# Patient Record
Sex: FEMALE | Race: WHITE | NOT HISPANIC OR LATINO | Employment: FULL TIME | URBAN - NONMETROPOLITAN AREA
[De-identification: names, ages, dates, MRNs, and addresses within clinical notes are randomized per-mention and may not be internally consistent; named-entity substitution may affect disease eponyms.]

---

## 2023-07-10 LAB
HEPATITIS B VIRUS SURFACE AB (MIU/ML) IN SERUM: 23.5 MIU/ML
MUMPS IGG ANTIBODY: POSITIVE
RUBELLA VIRUS IGG AB: POSITIVE
RUBEOLA IGG ANTIBODY: POSITIVE
VARICELLA ZOSTER IGG: POSITIVE

## 2023-07-12 LAB — HEPATITIS B VIRUS SURFACE AG PRESENCE IN SERUM: NONREACTIVE

## 2023-07-13 LAB
NIL(NEG) CONTROL SPOT COUNT: NORMAL
PANEL A SPOT COUNT: 0
PANEL B SPOT COUNT: 0
POS CONTROL SPOT COUNT: NORMAL
T-SPOT. TB INTERPRETATION: NEGATIVE

## 2023-09-25 ENCOUNTER — OFFICE VISIT (OUTPATIENT)
Dept: PRIMARY CARE | Facility: CLINIC | Age: 33
End: 2023-09-25
Payer: COMMERCIAL

## 2023-09-25 VITALS
HEIGHT: 66 IN | HEART RATE: 91 BPM | DIASTOLIC BLOOD PRESSURE: 72 MMHG | OXYGEN SATURATION: 98 % | SYSTOLIC BLOOD PRESSURE: 120 MMHG | WEIGHT: 253 LBS | TEMPERATURE: 96.8 F | BODY MASS INDEX: 40.66 KG/M2

## 2023-09-25 DIAGNOSIS — K80.20 CALCULUS OF GALLBLADDER WITHOUT CHOLECYSTITIS WITHOUT OBSTRUCTION: Primary | ICD-10-CM

## 2023-09-25 PROCEDURE — 99214 OFFICE O/P EST MOD 30 MIN: CPT | Performed by: FAMILY MEDICINE

## 2023-09-25 PROCEDURE — 1036F TOBACCO NON-USER: CPT | Performed by: FAMILY MEDICINE

## 2023-09-25 RX ORDER — HYDROXYZINE HYDROCHLORIDE 25 MG/1
TABLET, FILM COATED ORAL
COMMUNITY
Start: 2022-12-06

## 2023-09-25 ASSESSMENT — ENCOUNTER SYMPTOMS
SLEEP DISTURBANCE: 0
UNEXPECTED WEIGHT CHANGE: 0
WEAKNESS: 0
HEMATURIA: 0
NAUSEA: 0
ACTIVITY CHANGE: 0
VOMITING: 0
RHINORRHEA: 0
JOINT SWELLING: 0
BLOOD IN STOOL: 0
DIARRHEA: 0
FLANK PAIN: 0
DYSURIA: 0
ARTHRALGIAS: 0
LIGHT-HEADEDNESS: 0
HEADACHES: 0
EYE ITCHING: 0
COUGH: 0
SORE THROAT: 0
DIZZINESS: 0
ABDOMINAL DISTENTION: 1
DYSPHORIC MOOD: 0
EYE DISCHARGE: 0
NERVOUS/ANXIOUS: 0
ABDOMINAL PAIN: 1
NUMBNESS: 0
SINUS PRESSURE: 0
APPETITE CHANGE: 0
WHEEZING: 0
SHORTNESS OF BREATH: 0
CONSTIPATION: 0
MYALGIAS: 0
PALPITATIONS: 0
FEVER: 0

## 2023-09-26 PROBLEM — F41.9 ANXIETY: Status: ACTIVE | Noted: 2023-09-26

## 2023-09-26 PROBLEM — I47.10 SVT (SUPRAVENTRICULAR TACHYCARDIA) (CMS-HCC): Status: ACTIVE | Noted: 2023-09-26

## 2023-10-02 ENCOUNTER — PREP FOR PROCEDURE (OUTPATIENT)
Dept: SURGERY | Facility: CLINIC | Age: 33
End: 2023-10-02

## 2023-10-02 ENCOUNTER — OFFICE VISIT (OUTPATIENT)
Dept: SURGERY | Facility: CLINIC | Age: 33
End: 2023-10-02
Payer: COMMERCIAL

## 2023-10-02 VITALS
BODY MASS INDEX: 39.87 KG/M2 | HEART RATE: 93 BPM | HEIGHT: 67 IN | TEMPERATURE: 97.6 F | DIASTOLIC BLOOD PRESSURE: 73 MMHG | SYSTOLIC BLOOD PRESSURE: 125 MMHG | WEIGHT: 254 LBS

## 2023-10-02 DIAGNOSIS — K80.00 CHOLELITHIASIS AND ACUTE CHOLECYSTITIS WITHOUT OBSTRUCTION: Primary | ICD-10-CM

## 2023-10-02 DIAGNOSIS — K80.20 CALCULUS OF GALLBLADDER WITHOUT CHOLECYSTITIS WITHOUT OBSTRUCTION: Primary | ICD-10-CM

## 2023-10-02 PROCEDURE — 1036F TOBACCO NON-USER: CPT | Performed by: SURGERY

## 2023-10-02 PROCEDURE — 99244 OFF/OP CNSLTJ NEW/EST MOD 40: CPT | Performed by: SURGERY

## 2023-10-02 RX ORDER — ACETAMINOPHEN 325 MG/1
975 TABLET ORAL ONCE
Status: CANCELLED | OUTPATIENT
Start: 2023-10-02 | End: 2023-10-02

## 2023-10-02 RX ORDER — CELECOXIB 50 MG/1
200 CAPSULE ORAL ONCE
Status: CANCELLED | OUTPATIENT
Start: 2023-10-02 | End: 2023-10-02

## 2023-10-02 RX ORDER — SODIUM CHLORIDE, SODIUM LACTATE, POTASSIUM CHLORIDE, CALCIUM CHLORIDE 600; 310; 30; 20 MG/100ML; MG/100ML; MG/100ML; MG/100ML
100 INJECTION, SOLUTION INTRAVENOUS CONTINUOUS
Status: CANCELLED | OUTPATIENT
Start: 2023-10-02

## 2023-10-02 ASSESSMENT — ENCOUNTER SYMPTOMS
ABDOMINAL PAIN: 1
NAUSEA: 1

## 2023-10-02 NOTE — H&P (VIEW-ONLY)
Subjective   Patient ID: Jasmina Draper is a 33 y.o. female who presents for Cholelithiasis and Abdominal Pain.  Abdominal Pain  Associated symptoms include nausea.    patient was well until 3 weeks ago when she developed severe back pain that radiated to the front.  She has some associated nausea vomiting.  The pain brought her to the emergency room.  In the emergency room she was noted to have gallstones on her CT scan.  She also had focal colitis on the CAT scan.  She admits to bloating and belching.  She has still had symptoms while on a low-fat diet.    Past Medical History:   Diagnosis Date    Anxiety         Current Outpatient Medications on File Prior to Visit   Medication Sig Dispense Refill    hydrOXYzine HCL (Atarax) 25 mg tablet TAKE 1 TABLET BY MOUTH EVERY 8 HOURS AS NEEDED FOR ITCHING/RASH.       No current facility-administered medications on file prior to visit.        Review of Systems   Gastrointestinal:  Positive for abdominal pain and nausea.   All other systems reviewed and are negative.      Vitals:    10/02/23 1416   BP: 125/73   Pulse: 93   Temp: 36.4 °C (97.6 °F)        Objective     Physical Exam  Vitals reviewed. Exam conducted with a chaperone present.   HENT:      Head: Normocephalic.      Nose: Nose normal.      Mouth/Throat:      Pharynx: Oropharynx is clear.   Cardiovascular:      Rate and Rhythm: Normal rate and regular rhythm.      Heart sounds: Normal heart sounds.   Pulmonary:      Effort: Pulmonary effort is normal.      Breath sounds: Normal breath sounds.   Abdominal:      General: Abdomen is flat.      Palpations: Abdomen is soft. There is no mass.      Tenderness: There is abdominal tenderness in the right upper quadrant. There is no guarding.      Hernia: No hernia is present.   Genitourinary:     General: Normal vulva.   Musculoskeletal:         General: Normal range of motion.      Cervical back: Normal range of motion.   Skin:     General: Skin is warm.   Neurological:       General: No focal deficit present.   Psychiatric:         Mood and Affect: Mood normal.         Problem List Items Addressed This Visit       Calculus of gallbladder without cholecystitis without obstruction - Primary        Assessment/Plan   Impression-symptomatic cholelithiasis.  Plan-laparoscopic cholecystectomy 10.12.23.  Risks per consent.    Des Mcclendon MD

## 2023-10-04 ENCOUNTER — PRE-ADMISSION TESTING (OUTPATIENT)
Dept: PREADMISSION TESTING | Facility: HOSPITAL | Age: 33
End: 2023-10-04
Payer: COMMERCIAL

## 2023-10-04 ASSESSMENT — DUKE ACTIVITY SCORE INDEX (DASI)
CAN YOU DO LIGHT WORK AROUND THE HOUSE LIKE DUSTING OR WASHING DISHES: YES
CAN YOU CLIMB A FLIGHT OF STAIRS OR WALK UP A HILL: YES
CAN YOU DO HEAVY WORK AROUND THE HOUSE LIKE SCRUBBING FLOORS OR LIFTING AND MOVING HEAVY FURNITURE: YES
CAN YOU HAVE SEXUAL RELATIONS: YES
TOTAL_SCORE: 58.2
DASI METS SCORE: 9.9
CAN YOU WALK INDOORS, SUCH AS AROUND YOUR HOUSE: YES
CAN YOU RUN A SHORT DISTANCE: YES
CAN YOU PARTICIPATE IN STRENOUS SPORTS LIKE SWIMMING, SINGLES TENNIS, FOOTBALL, BASKETBALL, OR SKIING: YES
CAN YOU PARTICIPATE IN MODERATE RECREATIONAL ACTIVITIES LIKE GOLF, BOWLING, DANCING, DOUBLES TENNIS OR THROWING A BASEBALL OR FOOTBALL: YES
CAN YOU DO YARD WORK LIKE RAKING LEAVES, WEEDING OR PUSHING A MOWER: YES
CAN YOU DO MODERATE WORK AROUND THE HOUSE LIKE VACUUMING, SWEEPING FLOORS OR CARRYING GROCERIES: YES
CAN YOU TAKE CARE OF YOURSELF (EAT, DRESS, BATHE, OR USE TOILET): YES
CAN YOU WALK A BLOCK OR TWO ON LEVEL GROUND: YES

## 2023-10-04 ASSESSMENT — CHADS2 SCORE: AGE GREATER THAN OR EQUAL TO 75: NO

## 2023-10-04 NOTE — PREPROCEDURE INSTRUCTIONS
Medication List            Accurate as of October 4, 2023 11:08 AM. Always use your most recent med list.                hydrOXYzine HCL 25 mg tablet  Commonly known as: Atarax  Medication Adjustments for Surgery: Continue until night before surgery                 Call [phone] day before surgery between 1-3pm for arrival time.  Day of surgery, park in back and take elevator to second floor. Outpatient surgery dept will be across from elevator.   No eating or drinking after midnight.   Please bring .   Wear comfy clothing, leave valuables at home.               NPO Instructions:    Do not eat any food after midnight the night before your surgery/procedure.    Additional Instructions:     Review your medication instructions, take indicated medications  Wear  comfortable loose fitting clothing  All jewelry and valuables should be left at home

## 2023-10-05 ENCOUNTER — TELEPHONE (OUTPATIENT)
Dept: SURGERY | Facility: CLINIC | Age: 33
End: 2023-10-05
Payer: COMMERCIAL

## 2023-10-05 DIAGNOSIS — Z12.11 ENCOUNTER FOR SCREENING COLONOSCOPY: Primary | ICD-10-CM

## 2023-10-09 ENCOUNTER — ANESTHESIA EVENT (OUTPATIENT)
Dept: OPERATING ROOM | Facility: HOSPITAL | Age: 33
End: 2023-10-09
Payer: COMMERCIAL

## 2023-10-12 ENCOUNTER — PHARMACY VISIT (OUTPATIENT)
Dept: PHARMACY | Facility: CLINIC | Age: 33
End: 2023-10-12
Payer: COMMERCIAL

## 2023-10-12 ENCOUNTER — HOSPITAL ENCOUNTER (OUTPATIENT)
Facility: HOSPITAL | Age: 33
Setting detail: OUTPATIENT SURGERY
Discharge: HOME | End: 2023-10-12
Attending: SURGERY | Admitting: SURGERY
Payer: COMMERCIAL

## 2023-10-12 ENCOUNTER — ANESTHESIA (OUTPATIENT)
Dept: OPERATING ROOM | Facility: HOSPITAL | Age: 33
End: 2023-10-12
Payer: COMMERCIAL

## 2023-10-12 VITALS
HEIGHT: 65 IN | OXYGEN SATURATION: 99 % | WEIGHT: 251 LBS | HEART RATE: 70 BPM | DIASTOLIC BLOOD PRESSURE: 57 MMHG | TEMPERATURE: 96.8 F | RESPIRATION RATE: 16 BRPM | SYSTOLIC BLOOD PRESSURE: 121 MMHG | BODY MASS INDEX: 41.82 KG/M2

## 2023-10-12 DIAGNOSIS — K80.20 CALCULUS OF GALLBLADDER WITHOUT CHOLECYSTITIS WITHOUT OBSTRUCTION: Primary | ICD-10-CM

## 2023-10-12 DIAGNOSIS — K80.00 CHOLELITHIASIS AND ACUTE CHOLECYSTITIS WITHOUT OBSTRUCTION: ICD-10-CM

## 2023-10-12 LAB — HCG UR QL IA.RAPID: NEGATIVE

## 2023-10-12 PROCEDURE — 2500000004 HC RX 250 GENERAL PHARMACY W/ HCPCS (ALT 636 FOR OP/ED)

## 2023-10-12 PROCEDURE — 2500000004 HC RX 250 GENERAL PHARMACY W/ HCPCS (ALT 636 FOR OP/ED): Performed by: NURSE ANESTHETIST, CERTIFIED REGISTERED

## 2023-10-12 PROCEDURE — RXMED WILLOW AMBULATORY MEDICATION CHARGE

## 2023-10-12 PROCEDURE — 2500000001 HC RX 250 WO HCPCS SELF ADMINISTERED DRUGS (ALT 637 FOR MEDICARE OP)

## 2023-10-12 PROCEDURE — 2500000004 HC RX 250 GENERAL PHARMACY W/ HCPCS (ALT 636 FOR OP/ED): Performed by: SURGERY

## 2023-10-12 PROCEDURE — 7100000009 HC PHASE TWO TIME - INITIAL BASE CHARGE: Performed by: SURGERY

## 2023-10-12 PROCEDURE — 47562 LAPAROSCOPIC CHOLECYSTECTOMY: CPT | Performed by: SURGERY

## 2023-10-12 PROCEDURE — 3600000004 HC OR TIME - INITIAL BASE CHARGE - PROCEDURE LEVEL FOUR: Performed by: SURGERY

## 2023-10-12 PROCEDURE — 7100000010 HC PHASE TWO TIME - EACH INCREMENTAL 1 MINUTE: Performed by: SURGERY

## 2023-10-12 PROCEDURE — 2580000001 HC RX 258 IV SOLUTIONS: Performed by: SURGERY

## 2023-10-12 PROCEDURE — 88304 TISSUE EXAM BY PATHOLOGIST: CPT | Mod: TC,CMCLAB,GENLAB | Performed by: SURGERY

## 2023-10-12 PROCEDURE — 3600000009 HC OR TIME - EACH INCREMENTAL 1 MINUTE - PROCEDURE LEVEL FOUR: Performed by: SURGERY

## 2023-10-12 PROCEDURE — 2500000001 HC RX 250 WO HCPCS SELF ADMINISTERED DRUGS (ALT 637 FOR MEDICARE OP): Performed by: NURSE ANESTHETIST, CERTIFIED REGISTERED

## 2023-10-12 PROCEDURE — 2500000005 HC RX 250 GENERAL PHARMACY W/O HCPCS

## 2023-10-12 PROCEDURE — 2720000007 HC OR 272 NO HCPCS: Performed by: SURGERY

## 2023-10-12 PROCEDURE — 2780000003 HC OR 278 NO HCPCS: Performed by: SURGERY

## 2023-10-12 PROCEDURE — 3700000001 HC GENERAL ANESTHESIA TIME - INITIAL BASE CHARGE: Performed by: SURGERY

## 2023-10-12 PROCEDURE — S0119 ONDANSETRON 4 MG: HCPCS

## 2023-10-12 PROCEDURE — 88304 TISSUE EXAM BY PATHOLOGIST: CPT | Mod: TC,SUR,CMCLAB,GENLAB | Performed by: SURGERY

## 2023-10-12 PROCEDURE — 7100000002 HC RECOVERY ROOM TIME - EACH INCREMENTAL 1 MINUTE: Performed by: SURGERY

## 2023-10-12 PROCEDURE — 3700000002 HC GENERAL ANESTHESIA TIME - EACH INCREMENTAL 1 MINUTE: Performed by: SURGERY

## 2023-10-12 PROCEDURE — 81025 URINE PREGNANCY TEST: CPT | Performed by: PHYSICIAN ASSISTANT

## 2023-10-12 PROCEDURE — 88304 TISSUE EXAM BY PATHOLOGIST: CPT | Performed by: STUDENT IN AN ORGANIZED HEALTH CARE EDUCATION/TRAINING PROGRAM

## 2023-10-12 PROCEDURE — 7100000001 HC RECOVERY ROOM TIME - INITIAL BASE CHARGE: Performed by: SURGERY

## 2023-10-12 RX ORDER — KETOROLAC TROMETHAMINE 30 MG/ML
INJECTION, SOLUTION INTRAMUSCULAR; INTRAVENOUS AS NEEDED
Status: DISCONTINUED | OUTPATIENT
Start: 2023-10-12 | End: 2023-10-12

## 2023-10-12 RX ORDER — BUPIVACAINE HYDROCHLORIDE 5 MG/ML
INJECTION, SOLUTION EPIDURAL; INTRACAUDAL AS NEEDED
Status: DISCONTINUED | OUTPATIENT
Start: 2023-10-12 | End: 2023-10-12 | Stop reason: HOSPADM

## 2023-10-12 RX ORDER — ONDANSETRON 4 MG/1
4 TABLET, ORALLY DISINTEGRATING ORAL EVERY 8 HOURS PRN
Status: DISCONTINUED | OUTPATIENT
Start: 2023-10-12 | End: 2023-10-12 | Stop reason: HOSPADM

## 2023-10-12 RX ORDER — ONDANSETRON 4 MG/1
4 TABLET, ORALLY DISINTEGRATING ORAL ONCE
Status: DISCONTINUED | OUTPATIENT
Start: 2023-10-12 | End: 2023-10-12 | Stop reason: HOSPADM

## 2023-10-12 RX ORDER — IBUPROFEN 600 MG/1
600 TABLET ORAL EVERY 6 HOURS PRN
Qty: 20 TABLET | Refills: 0 | Status: SHIPPED | OUTPATIENT
Start: 2023-10-12 | End: 2023-10-22

## 2023-10-12 RX ORDER — OXYCODONE HYDROCHLORIDE 5 MG/1
5 TABLET ORAL ONCE
Status: COMPLETED | OUTPATIENT
Start: 2023-10-12 | End: 2023-10-12

## 2023-10-12 RX ORDER — DEXAMETHASONE SODIUM PHOSPHATE 4 MG/ML
INJECTION, SOLUTION INTRA-ARTICULAR; INTRALESIONAL; INTRAMUSCULAR; INTRAVENOUS; SOFT TISSUE AS NEEDED
Status: DISCONTINUED | OUTPATIENT
Start: 2023-10-12 | End: 2023-10-12

## 2023-10-12 RX ORDER — CELECOXIB 100 MG/1
200 CAPSULE ORAL ONCE
Status: COMPLETED | OUTPATIENT
Start: 2023-10-12 | End: 2023-10-12

## 2023-10-12 RX ORDER — OXYCODONE HYDROCHLORIDE 5 MG/1
5 TABLET ORAL EVERY 4 HOURS PRN
Status: DISCONTINUED | OUTPATIENT
Start: 2023-10-12 | End: 2023-10-12 | Stop reason: HOSPADM

## 2023-10-12 RX ORDER — PHENYLEPHRINE HCL IN 0.9% NACL 1 MG/10 ML
SYRINGE (ML) INTRAVENOUS AS NEEDED
Status: DISCONTINUED | OUTPATIENT
Start: 2023-10-12 | End: 2023-10-12

## 2023-10-12 RX ORDER — ACETAMINOPHEN 325 MG/1
TABLET ORAL
Status: COMPLETED
Start: 2023-10-12 | End: 2023-10-12

## 2023-10-12 RX ORDER — ROCURONIUM BROMIDE 10 MG/ML
INJECTION, SOLUTION INTRAVENOUS AS NEEDED
Status: DISCONTINUED | OUTPATIENT
Start: 2023-10-12 | End: 2023-10-12

## 2023-10-12 RX ORDER — ONDANSETRON HYDROCHLORIDE 2 MG/ML
4 INJECTION, SOLUTION INTRAVENOUS EVERY 8 HOURS PRN
Status: DISCONTINUED | OUTPATIENT
Start: 2023-10-12 | End: 2023-10-12 | Stop reason: HOSPADM

## 2023-10-12 RX ORDER — SODIUM CHLORIDE, SODIUM LACTATE, POTASSIUM CHLORIDE, CALCIUM CHLORIDE 600; 310; 30; 20 MG/100ML; MG/100ML; MG/100ML; MG/100ML
100 INJECTION, SOLUTION INTRAVENOUS CONTINUOUS
Status: DISCONTINUED | OUTPATIENT
Start: 2023-10-12 | End: 2023-10-12 | Stop reason: HOSPADM

## 2023-10-12 RX ORDER — FENTANYL CITRATE 50 UG/ML
INJECTION, SOLUTION INTRAMUSCULAR; INTRAVENOUS AS NEEDED
Status: DISCONTINUED | OUTPATIENT
Start: 2023-10-12 | End: 2023-10-12

## 2023-10-12 RX ORDER — MIDAZOLAM HYDROCHLORIDE 1 MG/ML
INJECTION INTRAMUSCULAR; INTRAVENOUS AS NEEDED
Status: DISCONTINUED | OUTPATIENT
Start: 2023-10-12 | End: 2023-10-12

## 2023-10-12 RX ORDER — ACETAMINOPHEN 325 MG/1
650 TABLET ORAL EVERY 8 HOURS
Qty: 20 TABLET | Refills: 0 | COMMUNITY
Start: 2023-10-12 | End: 2023-10-17

## 2023-10-12 RX ORDER — ONDANSETRON HYDROCHLORIDE 2 MG/ML
4 INJECTION, SOLUTION INTRAVENOUS ONCE AS NEEDED
Status: DISCONTINUED | OUTPATIENT
Start: 2023-10-12 | End: 2023-10-12

## 2023-10-12 RX ORDER — ACETAMINOPHEN 325 MG/1
975 TABLET ORAL ONCE
Status: COMPLETED | OUTPATIENT
Start: 2023-10-12 | End: 2023-10-12

## 2023-10-12 RX ORDER — CELECOXIB 100 MG/1
CAPSULE ORAL
Status: COMPLETED
Start: 2023-10-12 | End: 2023-10-12

## 2023-10-12 RX ORDER — FENTANYL CITRATE 50 UG/ML
25 INJECTION, SOLUTION INTRAMUSCULAR; INTRAVENOUS EVERY 5 MIN PRN
Status: DISCONTINUED | OUTPATIENT
Start: 2023-10-12 | End: 2023-10-12 | Stop reason: HOSPADM

## 2023-10-12 RX ORDER — PROPOFOL 10 MG/ML
INJECTION, EMULSION INTRAVENOUS AS NEEDED
Status: DISCONTINUED | OUTPATIENT
Start: 2023-10-12 | End: 2023-10-12

## 2023-10-12 RX ORDER — ONDANSETRON HYDROCHLORIDE 2 MG/ML
INJECTION, SOLUTION INTRAVENOUS AS NEEDED
Status: DISCONTINUED | OUTPATIENT
Start: 2023-10-12 | End: 2023-10-12

## 2023-10-12 RX ADMIN — SODIUM CHLORIDE, POTASSIUM CHLORIDE, SODIUM LACTATE AND CALCIUM CHLORIDE 100 ML/HR: 600; 310; 30; 20 INJECTION, SOLUTION INTRAVENOUS at 12:25

## 2023-10-12 RX ADMIN — FENTANYL CITRATE 50 MCG: 50 INJECTION, SOLUTION INTRAMUSCULAR; INTRAVENOUS at 15:21

## 2023-10-12 RX ADMIN — SODIUM CHLORIDE, POTASSIUM CHLORIDE, SODIUM LACTATE AND CALCIUM CHLORIDE: 600; 310; 30; 20 INJECTION, SOLUTION INTRAVENOUS at 16:09

## 2023-10-12 RX ADMIN — OXYCODONE HYDROCHLORIDE 5 MG: 5 TABLET ORAL at 16:53

## 2023-10-12 RX ADMIN — ONDANSETRON 4 MG: 2 INJECTION INTRAMUSCULAR; INTRAVENOUS at 15:21

## 2023-10-12 RX ADMIN — CELECOXIB 200 MG: 100 CAPSULE ORAL at 12:22

## 2023-10-12 RX ADMIN — ROCURONIUM BROMIDE 40 MG: 10 INJECTION, SOLUTION INTRAVENOUS at 15:04

## 2023-10-12 RX ADMIN — SUGAMMADEX 200 MG: 100 INJECTION, SOLUTION INTRAVENOUS at 16:20

## 2023-10-12 RX ADMIN — Medication 100 MCG: at 15:41

## 2023-10-12 RX ADMIN — PROPOFOL 150 MG: 10 INJECTION, EMULSION INTRAVENOUS at 15:04

## 2023-10-12 RX ADMIN — OXYCODONE HYDROCHLORIDE 5 MG: 5 TABLET ORAL at 17:36

## 2023-10-12 RX ADMIN — HYDROMORPHONE HYDROCHLORIDE 0.5 MG: 1 INJECTION, SOLUTION INTRAMUSCULAR; INTRAVENOUS; SUBCUTANEOUS at 16:42

## 2023-10-12 RX ADMIN — DEXAMETHASONE SODIUM PHOSPHATE 8 MG: 4 INJECTION, SOLUTION INTRAMUSCULAR; INTRAVENOUS at 15:21

## 2023-10-12 RX ADMIN — PROPOFOL 50 MG: 10 INJECTION, EMULSION INTRAVENOUS at 16:11

## 2023-10-12 RX ADMIN — ACETAMINOPHEN 975 MG: 325 TABLET ORAL at 12:24

## 2023-10-12 RX ADMIN — MIDAZOLAM HYDROCHLORIDE 2 MG: 1 INJECTION, SOLUTION INTRAMUSCULAR; INTRAVENOUS at 14:58

## 2023-10-12 RX ADMIN — ONDANSETRON 4 MG: 4 TABLET, ORALLY DISINTEGRATING ORAL at 17:14

## 2023-10-12 RX ADMIN — ROCURONIUM BROMIDE 10 MG: 10 INJECTION, SOLUTION INTRAVENOUS at 15:24

## 2023-10-12 RX ADMIN — FENTANYL CITRATE 50 MCG: 50 INJECTION, SOLUTION INTRAMUSCULAR; INTRAVENOUS at 15:04

## 2023-10-12 RX ADMIN — KETOROLAC TROMETHAMINE 30 MG: 30 INJECTION, SOLUTION INTRAMUSCULAR at 16:10

## 2023-10-12 SDOH — HEALTH STABILITY: MENTAL HEALTH: CURRENT SMOKER: 0

## 2023-10-12 ASSESSMENT — PAIN SCALES - GENERAL
PAINLEVEL_OUTOF10: 9
PAINLEVEL_OUTOF10: 2
PAINLEVEL_OUTOF10: 10 - WORST POSSIBLE PAIN
PAINLEVEL_OUTOF10: 2
PAINLEVEL_OUTOF10: 8
PAINLEVEL_OUTOF10: 10 - WORST POSSIBLE PAIN
PAINLEVEL_OUTOF10: 10 - WORST POSSIBLE PAIN
PAINLEVEL_OUTOF10: 8
PAINLEVEL_OUTOF10: 2
PAINLEVEL_OUTOF10: 10 - WORST POSSIBLE PAIN
PAINLEVEL_OUTOF10: 5 - MODERATE PAIN
PAINLEVEL_OUTOF10: 5 - MODERATE PAIN

## 2023-10-12 ASSESSMENT — PAIN - FUNCTIONAL ASSESSMENT
PAIN_FUNCTIONAL_ASSESSMENT: 0-10
PAIN_FUNCTIONAL_ASSESSMENT: 0-10

## 2023-10-12 NOTE — OP NOTE
Cholecystectomy Laparoscopy Operative Note     Date: 10/12/2023  OR Location: [facility]    Name: Jasmina Draper, : 1990, Age: 33 y.o., MRN: [id number], Sex: female    Diagnosis  Pre-op Diagnosis     * Cholelithiasis and acute cholecystitis without obstruction [K80.00] Post-op Diagnosis     * Cholelithiasis and acute cholecystitis without obstruction [K80.00]     Procedures  Cholecystectomy Laparoscopy  [address] - [place] LAPAROSCOPY SURG CHOLECYSTECTOMY      Surgeons      * Des Mcclendon - Primary    Resident/Fellow/Other Assistant:  No surgical staff documented.    Procedure Summary  Anesthesia: General  ASA: II  Anesthesia Staff: CRNA: JOSE Shea-CRNA  Estimated Blood Loss: 5 mL  Intra-op Medications:   Medication Name Total Dose   lactated Ringer's infusion 166.67 mL   acetaminophen (Tylenol) tablet 975 mg 975 mg   celecoxib (CeleBREX) capsule 200 mg 200 mg              Anesthesia Record               Intraprocedure I/O Totals          Intake    Phenylephrine Drip 0.00 mL    The total shown is the total volume documented since Anesthesia Start was filed.    lactated Ringer's infusion 1000.00 mL    Total Intake 1000 mL       Output    Est. Blood Loss 5 mL    Total Output 5 mL       Net    Net Volume 995 mL          Specimen:   ID Type Source Tests Collected by Time   1 : Gallbladder Tissue GALLBLADDER CHOLECYSTECTOMY SURGICAL PATHOLOGY EXAM Des Mcclendon MD 10/12/2023 1603        Staff:   Circulator: Sweta Evangelista RN  Relief Scrub: Mayra Figueroa MA  Scrub Person: Makenna Oates RN; Amelia Cox RN; Anita Batista RN         Drains and/or Catheters: * None in log *    Tourniquet Times:         Implants:     Findings: Massively distended gallbladder with 3 very large stones.  Significant inflammation of the base of the gallbladder.  The critical view of safety was clearly identified.  The cystic duct and cystic artery were both noted entering the gallbladder  Anteriorly and posteriorly.   Multiple adhesions were present which were carefully dissected with sharp dissection.  The patient had morbid obesity.      Indications: Jasmina Draper is an 33 y.o. female who is having surgery for Cholelithiasis and acute cholecystitis without obstruction [K80.00].  Who presented for symptoms of cholelithiasis with severe right upper quadrant pain nausea and bloating.  She wished undergo surgical invention.    The patient was seen in the preoperative area. The risks, benefits, complications, treatment options, non-operative alternatives, expected recovery and outcomes were discussed with the patient. The possibilities of reaction to medication, pulmonary aspiration, injury to surrounding structures, bleeding, recurrent infection, the need for additional procedures, failure to diagnose a condition, and creating a complication requiring transfusion or operation were discussed with the patient. The patient concurred with the proposed plan, giving informed consent.  The site of surgery was properly noted/marked if necessary per policy. The patient has been actively warmed in preoperative area. Preoperative antibiotics are not indicated. Venous thrombosis prophylaxis have been ordered including bilateral sequential compression devices    Procedure Details: After obtaining informed consent from the patient and discussing all the risks which include, but not limited to pain, infection, bleeding, risk of injury to common bile duct, biloma, need for endoscopic retrograde cholangiopancreatography, persistence of symptoms, risk of cardiac, pulmonary, neurologic, locomotor, anesthetic events, and other unforeseen complications including death the abdomen is prepped and draped in aseptic fashion.  A supraumbilical incision was made.  Dissection was carried to the fascia.  Stay sutures were placed on either side of the midline.  The fascia was opened.  The peritoneal cavity was entered. A #12 Cifuentes trocar is introduced.   CO2 insufflation was commenced.  The gallbladder was visualized.  Two 5 mm trocars were placed in the midclavicular and anterior axillary line.  The 11 mm epigastric trocar was placed to the right of the midline.  The abdomen was insufflated and the gallbladder was visualized.  The gallbladder was grasped with the trocars and retracted laterally and superiorly.  This was extremely distended with a massive stone at the base of the gallbladder.  There were multiple adhesions to the base of the gallbladder.  I carefully dissected these with sharp dissection.  I removed all the adhesions.  I then freed the gallbladder and elevated superiorly and laterally.  I done this identified a critical view of safety by freeing the gallbladder from the cystic plate of the liver.  There was a significant mount of sensitizations.  Identified the cystic duct and cystic artery.  I  this.  I freed the cystic duct and the cystic artery and then placed 2 Weck clips on the cystic duct and transected the cystic duct between the 2 Weck clips.  I carefully dissected the artery and placed 2 further white clips on the cystic artery and transected the artery between the clips I was mindful of the right hepatic artery as there is significant amount of inflammation at the area.  I carefully dissected the gallbladder from this region and free the gallbladder from the surrounding tissue with the cautery.  I was eventually grossly dissected up along the gallbladder attachments to the top of the liver bed and I was able to remove the gallbladder successfully.  There was complete hemostasis.  There was some oozing from the transected adhesions and to control this with the cautery.  The gallbladder was placed in an Endobag.  I examined the gallbladder fossa.  There was complete hemostasis..  Excess fluid is removed.  The trocars removed under direct vision along with the gallbladder.  The fascia was closed with 0 Vicryl suture.  The skin was  closed with 4-0 Monocryl suture.   All needle sponge and instrument counts were correct x2.  Patient tolerated the  procedure well and was discharged to the recovery room in stable condition.  Complications:  None; patient tolerated the procedure well.    Disposition: PACU - hemodynamically stable.  Condition: stable     Additional Details: Wound class II.  Time equals 60 minutes.  Body mass index 41    Attending Attestation: I was present and scrubbed for the entire procedure.    Des Mcclendon  Phone Number: [phone]

## 2023-10-12 NOTE — PERIOPERATIVE NURSING NOTE
Discharge instructions reviewed with patients . He denies questions or concerns. Pt resting comfortably. States she is comfortable. Pt requesting to go home. Pt sits on side of bed, denies dizziness or nausea.  helping pt get dressed.

## 2023-10-12 NOTE — DISCHARGE INSTRUCTIONS
Cholecystectomy Discharge Instructions    About this topic  Cholecystectomy is surgery to remove the gallbladder. There are two types of surgeries done for this. You may have had your gallbladder taken out with a laparoscope. This means you have a few small cuts in your belly. Other times, it is taken out through one large cut in your belly. This is called an open procedure.    What care is needed at home?  Ask your doctor what you need to do when you go home. Make sure you ask questions if you do not understand what the doctor says. This way you will know what you need to do.  Talk to your doctor about how to care for your cut site. Ask your doctor about:  When you should change your bandages  When you may take a bath or shower  If you need to be careful with lifting things over 10 pounds (4.5 kg)  When you may go back to your normal activities like work, driving, or sex  Be sure to wash your hands before and after touching your wound or dressing.  If you had a laparoscopic procedure, you will have a few small cut sites. They may have special tape on them called steri-strips. These will fall off on their own in about 10 days. You can take them off after 10 to 14 days if they have not fallen off. They may also use a skin glue to keep the cut sites together. This will slowly peel off on its own in about 7 to 14 days. Do not pick or peel at them.  If you had an open procedure, you will have one large cut site. It may have sutures or staples. You may also have a drain to get rid of extra fluid. When the drain is taken out, there will be a small cut site.  Keep coughing and doing deep breathing exercises for 7 to 10 days after you go home. This will help prevent lung infections.    What follow-up care is needed?  Your doctor may ask you to make visits to the office to check on your progress. Be sure to keep these visits.  If you have stitches or staples, you will need to have them taken out. Your doctor will often want  to do this in 1 to 2 weeks. If the doctor used skin glue, the glue will fall off on its own.  If you have a drain, you will need to have that removed in the office. Your doctor will want to do this in 1 to 2 weeks.  What drugs may be needed?  The doctor may order drugs to:  Help with pain  Fight an infection  Soften stools if you are taking drugs for pain control  Help with upset stomach  Prevent blood clots  Will physical activity be limited?  Try to walk 3 to 4 times each day. Do your best to walk a little farther and longer each day.  You may need to rest for a while. Talk to your doctor before you run, work out, or play sports.  Do light household work only, such as washing dishes or helping with meals.  What changes to diet are needed?  Drink 8 to 10 glasses of fluids each day.  Avoid eating greasy or spicy foods.  Your doctor may suggest a high-fiber diet. Ask your doctor if you need to change your diet.  What problems could happen?  Bleeding  Infection  Swelling of the pancreas  Injury to small bowel  When do I need to call the doctor?  Signs of infection. These include a fever of 100.4°F (38°C) or higher, chills, very bad sore throat, pain with passing urine, or wound that will not heal.  Signs of wound infection. These include swelling, redness, warmth around the wound; too much pain when touched; yellowish, greenish, or bloody discharge; foul smell coming from the cut site; cut site opens up.  Signs of liver problems like upset stomach or throwing up, belly pain, feeling tired, dark urine, yellow skin or eyes, not hungry.  Bowel movements that are white or gray in color or no bowel movement for 2 to 3 days after surgery  Sudden onset of chest pain, fast heartbeat, breathing problems, and pain or tenderness in your calf could be a sign that a blood clot has traveled to your lungs. Call for emergency help right away.  Helpful tips  When you cough, sneeze, or do deep breathing exercises, press a pillow across  your cut to support the muscles to protect the wound and ease pain.  Be active to help healing and prevent blood clots.  Teach Back: Helping You Understand  The Teach Back Method helps you understand the information we are giving you. After you talk with the staff, tell them in your own words what you learned. This helps to make sure the staff has described each thing clearly. It also helps to explain things that may have been confusing. Before going home, make sure you can do these:  I can tell you about my procedure.  I can tell you how to care for my cut site.  I can tell you what I will do if I have swelling, redness, or warmth around my wound.  Where can I learn more?  [facility]  http://familydoctor.org/familydoctor/en/drugs-procedures-devices/procedures-devices/gallbladder-removal-laparoscopic-method.html  [facility]  https://www.[facility].[place]/health-topics/xg255867  [facility]  https://www.nhs.uk/conditions/gallbladder-removal/what-happens/

## 2023-10-12 NOTE — PERIOPERATIVE NURSING NOTE
"1745: Pt observed sleeping, no s/s of discomfort.   1750: Pt opens eyes, Pt states pain is 5/10 and much better and \"tolerable\". She denies nausea. Pts  called to bedside.  1810: Dr. Mcclendon at bedside to speak to patient and significant other. Pt denies needs.    "

## 2023-10-12 NOTE — PERIOPERATIVE NURSING NOTE
1745: phase 1 assessment unchanged  1746: phase 2 admission assessment unchanged from my previous phase 1 assessment  1837: phase 2 complete. Assessment unchanged.

## 2023-10-12 NOTE — ANESTHESIA PROCEDURE NOTES
Airway  Date/Time: 10/12/2023 3:07 PM  Urgency: elective    Airway not difficult    Staffing  Performed: CRNA   Authorized by: ASHER Shea    Performed by: ASHER Shea  Patient location during procedure: OR    Indications and Patient Condition  Indications for airway management: anesthesia  Sedation level: deep  Preoxygenated: yes  Patient position: reverse Trendelenburg  Mask difficulty assessment: 0 - not attempted  Planned trial extubation    Final Airway Details  Final airway type: endotracheal airway      Successful airway: ETT  Cuffed: yes   Successful intubation technique: video laryngoscopy  Facilitating devices/methods: intubating stylet (Chew MAC)  Endotracheal tube insertion site: oral  Blade size: #4  ETT size (mm): 7.0  Cormack-Lehane Classification: grade I - full view of glottis  Placement verified by: chest auscultation and capnometry   Cuff volume (mL): 8  Measured from: lips  ETT to lips (cm): 22  Number of attempts at approach: 1  Ventilation between attempts: none  Number of other approaches attempted: 0

## 2023-10-12 NOTE — PERIOPERATIVE NURSING NOTE
1730: Pt states nausea is resolved. Pt is complaining of 9/10 abdominal pain. Pt states pain is in the center of the abdomen above the umbilicus. Discussed pts complaints with anesthesia and with Dr. Mcclendon. They are aware pt does not have an IV. Order received for additional dose of 5mg Oxy IR.   1736: Pt able to eat a few bites of eva doone cookie with pain medication  1746: Pt observed sleeping. No s/s of discomfort observed.

## 2023-10-12 NOTE — ANESTHESIA PREPROCEDURE EVALUATION
Patient: Jasmina Draper    Procedure Information       Date/Time: 10/12/23 1220    Procedure: Cholecystectomy Laparoscopy    Location: [facility] / [facility]    Surgeons: Des Mcclendon MD            Relevant Problems   Anesthesia (within normal limits)      Cardiovascular   (+) SVT (supraventricular tachycardia)      Endocrine (within normal limits)      GI (within normal limits)      /Renal (within normal limits)      Neuro/Psych   (+) Anxiety      Pulmonary (within normal limits)      GI/Hepatic   (+) Calculus of gallbladder without cholecystitis without obstruction   (+) Cholelithiasis and acute cholecystitis without obstruction      Hematology (within normal limits)      Musculoskeletal (within normal limits)      Eyes, Ears, Nose, and Throat (within normal limits)      Infectious Disease (within normal limits)       Clinical information reviewed:   Tobacco  Allergies  Meds   Med Hx  Surg Hx   Fam Hx  Soc Hx        NPO Detail:  NPO/Void Status  Carbonhydrate Drink Given Prior to Surgery? : N  Date of Last Liquid: 10/11/23  Time of Last Liquid: 2230  Date of Last Solid: 10/11/23  Time of Last Solid: 2230  Last Intake Type: Clear fluids         Physical Exam    Airway  Mallampati: II  TM distance: >3 FB  Neck ROM: full     Cardiovascular - normal exam  Rhythm: regular  Rate: normal     Dental - normal exam     Pulmonary - normal exam     Abdominal - normal exam             Anesthesia Plan    ASA 2     general     The patient is not a current smoker.    intravenous induction   Postoperative administration of opioids is intended.  Trial extubation is planned.  Anesthetic plan and risks discussed with patient.  Use of blood products discussed with patient who consented to blood products.    Plan discussed with CRNA.

## 2023-10-12 NOTE — PERIOPERATIVE NURSING NOTE
1655- pt feeling discomfort at IV site. IV will not flush. IV removed.  1700- patient complaining of nausea. Verbal order received from anesthesia for ODT zofran.

## 2023-10-13 ASSESSMENT — PAIN SCALES - GENERAL: PAIN_LEVEL: 0

## 2023-10-13 NOTE — ANESTHESIA POSTPROCEDURE EVALUATION
Patient: Jasmina Draper    Procedure Summary       Date: 10/12/23 Room / Location: [facility] / [facility]    Anesthesia Start: 1452 Anesthesia Stop: 1634    Procedure: Cholecystectomy Laparoscopy Diagnosis:       Cholelithiasis and acute cholecystitis without obstruction      (Cholelithiasis and acute cholecystitis without obstruction [K80.00])    Surgeons: Des Mcclendon MD Responsible Provider: ASHER Shea    Anesthesia Type: general ASA Status: 2            Anesthesia Type: general    Vitals Value Taken Time   /74 10/12/23 1740   Temp 36 °C (96.8 °F) 10/12/23 1745   Pulse 66 10/12/23 1740   Resp 14 10/12/23 1740   SpO2 97 % 10/12/23 1740       Anesthesia Post Evaluation    Patient location during evaluation: PACU  Patient participation: complete - patient participated  Level of consciousness: awake and alert  Pain score: 0  Pain management: adequate  Airway patency: patent  Cardiovascular status: acceptable  Respiratory status: acceptable  Hydration status: acceptable        No notable events documented.

## 2023-10-17 LAB
LABORATORY COMMENT REPORT: NORMAL
PATH REPORT.FINAL DX SPEC: NORMAL
PATH REPORT.GROSS SPEC: NORMAL
PATH REPORT.RELEVANT HX SPEC: NORMAL
PATH REPORT.TOTAL CANCER: NORMAL

## 2023-12-18 ENCOUNTER — PRE-ADMISSION TESTING (OUTPATIENT)
Dept: PREADMISSION TESTING | Facility: HOSPITAL | Age: 33
End: 2023-12-18
Payer: COMMERCIAL

## 2023-12-18 ENCOUNTER — ANESTHESIA EVENT (OUTPATIENT)
Dept: ANESTHESIOLOGY | Facility: HOSPITAL | Age: 33
End: 2023-12-18

## 2023-12-18 VITALS — BODY MASS INDEX: 38.57 KG/M2 | HEIGHT: 66 IN | WEIGHT: 240 LBS

## 2023-12-18 DIAGNOSIS — Z12.11 SPECIAL SCREENING FOR MALIGNANT NEOPLASMS, COLON: ICD-10-CM

## 2023-12-18 PROBLEM — E66.9 OBESITY: Status: ACTIVE | Noted: 2023-12-18

## 2023-12-18 RX ORDER — SODIUM PICOSULFATE, MAGNESIUM OXIDE, AND ANHYDROUS CITRIC ACID 12; 3.5; 1 G/175ML; G/175ML; MG/175ML
LIQUID ORAL
Qty: 350 ML | Refills: 0 | Status: SHIPPED | OUTPATIENT
Start: 2023-12-18

## 2023-12-18 ASSESSMENT — DUKE ACTIVITY SCORE INDEX (DASI)
CAN YOU CLIMB A FLIGHT OF STAIRS OR WALK UP A HILL: YES
CAN YOU HAVE SEXUAL RELATIONS: YES
DASI METS SCORE: 8.2
CAN YOU DO LIGHT WORK AROUND THE HOUSE LIKE DUSTING OR WASHING DISHES: YES
CAN YOU PARTICIPATE IN MODERATE RECREATIONAL ACTIVITIES LIKE GOLF, BOWLING, DANCING, DOUBLES TENNIS OR THROWING A BASEBALL OR FOOTBALL: NO
TOTAL_SCORE: 44.7
CAN YOU WALK A BLOCK OR TWO ON LEVEL GROUND: YES
CAN YOU DO HEAVY WORK AROUND THE HOUSE LIKE SCRUBBING FLOORS OR LIFTING AND MOVING HEAVY FURNITURE: YES
CAN YOU RUN A SHORT DISTANCE: YES
CAN YOU WALK INDOORS, SUCH AS AROUND YOUR HOUSE: YES
CAN YOU DO YARD WORK LIKE RAKING LEAVES, WEEDING OR PUSHING A MOWER: YES
CAN YOU TAKE CARE OF YOURSELF (EAT, DRESS, BATHE, OR USE TOILET): YES
CAN YOU DO MODERATE WORK AROUND THE HOUSE LIKE VACUUMING, SWEEPING FLOORS OR CARRYING GROCERIES: YES
CAN YOU PARTICIPATE IN STRENOUS SPORTS LIKE SWIMMING, SINGLES TENNIS, FOOTBALL, BASKETBALL, OR SKIING: NO

## 2023-12-18 NOTE — PREPROCEDURE INSTRUCTIONS
Medication List            Accurate as of December 18, 2023 10:34 AM. Always use your most recent med list.                hydrOXYzine HCL 25 mg tablet  Commonly known as: Atarax  Medication Adjustments for Surgery: Continue until night before surgery                              NPO Instructions:    Follow instructions. Day before procedure-may have light breakfast by 9am (ex. 1 egg, 1 piece of toast).  Then CLEAR LIQUIDS ONLY-No dairy products, nothing red/purple.  Take first part of prep- Evening Before Procedure.  Drink lots of liquids.  Day of Procedure- 3-4am Take Second Part of Prep.   STOP DRINKING 3 HOURS PRIOR TO PROCEDURE.  Take medications as instructed.  CLENPIQ instructions will be mailed to you from the office.     Additional Instructions:     Review your medication instructions, take indicated medications  Wear  comfortable loose fitting clothing  All jewelry and valuables should be left at home    Park in back of hospital by ER. Come up to Second floor-Outpt dept to check in.  Bring Photo ID and Insurance card,   You MUST have a  with you.      If you get ill at all before your procedure- CALL YOUR DOCTOR/SURGEON.  We want you in the best shape that is possible. Any sickness might lead to your procedure being delayed.      Call Outpatient dept at [phone] the night before your procedure (Friday for Monday procedure), between 1-3 pm.

## 2023-12-18 NOTE — ANESTHESIA PREPROCEDURE EVALUATION
Patient: Jasmina Draper    Procedure Information    Date: 23  Reason: PAT         Relevant Problems   Cardiovascular   (+) SVT (supraventricular tachycardia)      Endocrine   (+) Obesity      Neuro/Psych   (+) Anxiety      GI/Hepatic   (+) Calculus of gallbladder without cholecystitis without obstruction   (+) Cholelithiasis and acute cholecystitis without obstruction       Clinical information reviewed:               Chart reviewed. Recent Laparoscopic cholecystectomy under [facility] 23. Easy GR I airway, no anesthesia complications or issues.  No clearances ordered.    NPO Detail:  No data recorded     PHYSICAL EXAM    Anesthesia Plan    There were no vitals filed for this visit.    Past Surgical History:   Procedure Laterality Date     SECTION, CLASSIC N/A     DILATION AND CURETTAGE OF UTERUS      WISDOM TOOTH EXTRACTION       Past Medical History:   Diagnosis Date    Anxiety     Cholelithiasis        Current Outpatient Medications:     hydrOXYzine HCL (Atarax) 25 mg tablet, TAKE 1 TABLET BY MOUTH EVERY 8 HOURS AS NEEDED FOR ITCHING/RASH., Disp: , Rfl:   Prior to Admission medications    Medication Sig Start Date End Date Taking? Authorizing Provider   hydrOXYzine HCL (Atarax) 25 mg tablet TAKE 1 TABLET BY MOUTH EVERY 8 HOURS AS NEEDED FOR ITCHING/RASH. 22   Historical Provider, MD     Allergies   Allergen Reactions    Penicillin Hives     Social History     Tobacco Use    Smoking status: Never    Smokeless tobacco: Never   Substance Use Topics    Alcohol use: Yes     Comment: Socially, 1-2 drinks/month         Chemistry    Lab Results   Component Value Date/Time     2023 0245    K 3.2 (L) 2023 0245     2023 0245    CO2 26 2023 0245    BUN 17 2023 0245    CREATININE 0.79 2023 0245    Lab Results   Component Value Date/Time    CALCIUM 9.2 2023 0245    ALKPHOS 58 2023 0245    AST 8 (L) 2023 0245    ALT 11 2023 0245     BILITOT 0.4 09/13/2023 0245          Lab Results   Component Value Date/Time    WBC 8.2 09/13/2023 0245    HGB 12.2 09/13/2023 0245    HCT 38.5 09/13/2023 0245     09/13/2023 0245

## 2023-12-23 ENCOUNTER — PREP FOR PROCEDURE (OUTPATIENT)
Dept: SURGERY | Facility: HOSPITAL | Age: 33
End: 2023-12-23
Payer: COMMERCIAL

## 2023-12-23 RX ORDER — SODIUM CHLORIDE, SODIUM LACTATE, POTASSIUM CHLORIDE, CALCIUM CHLORIDE 600; 310; 30; 20 MG/100ML; MG/100ML; MG/100ML; MG/100ML
100 INJECTION, SOLUTION INTRAVENOUS CONTINUOUS
Status: CANCELLED | OUTPATIENT
Start: 2023-12-23

## 2023-12-23 RX ORDER — ONDANSETRON HYDROCHLORIDE 2 MG/ML
4 INJECTION, SOLUTION INTRAVENOUS ONCE AS NEEDED
Status: CANCELLED | OUTPATIENT
Start: 2023-12-23

## 2023-12-30 ENCOUNTER — ANESTHESIA (OUTPATIENT)
Dept: GASTROENTEROLOGY | Facility: HOSPITAL | Age: 33
End: 2023-12-30
Payer: COMMERCIAL

## 2023-12-30 ENCOUNTER — ANESTHESIA EVENT (OUTPATIENT)
Dept: GASTROENTEROLOGY | Facility: HOSPITAL | Age: 33
End: 2023-12-30
Payer: COMMERCIAL

## 2023-12-30 ENCOUNTER — HOSPITAL ENCOUNTER (OUTPATIENT)
Dept: GASTROENTEROLOGY | Facility: HOSPITAL | Age: 33
Setting detail: OUTPATIENT SURGERY
Discharge: HOME | End: 2023-12-30
Payer: COMMERCIAL

## 2023-12-30 VITALS
HEIGHT: 66 IN | BODY MASS INDEX: 38.57 KG/M2 | SYSTOLIC BLOOD PRESSURE: 129 MMHG | WEIGHT: 240 LBS | TEMPERATURE: 97 F | HEART RATE: 83 BPM | OXYGEN SATURATION: 96 % | RESPIRATION RATE: 17 BRPM | DIASTOLIC BLOOD PRESSURE: 78 MMHG

## 2023-12-30 DIAGNOSIS — Z12.11 ENCOUNTER FOR SCREENING COLONOSCOPY: ICD-10-CM

## 2023-12-30 LAB — HCG UR QL IA.RAPID: NEGATIVE

## 2023-12-30 PROCEDURE — 2500000005 HC RX 250 GENERAL PHARMACY W/O HCPCS: Performed by: NURSE ANESTHETIST, CERTIFIED REGISTERED

## 2023-12-30 PROCEDURE — 3700000002 HC GENERAL ANESTHESIA TIME - EACH INCREMENTAL 1 MINUTE

## 2023-12-30 PROCEDURE — 94760 N-INVAS EAR/PLS OXIMETRY 1: CPT | Mod: CCI

## 2023-12-30 PROCEDURE — 3700000001 HC GENERAL ANESTHESIA TIME - INITIAL BASE CHARGE

## 2023-12-30 PROCEDURE — G0121 COLON CA SCRN NOT HI RSK IND: HCPCS | Performed by: SURGERY

## 2023-12-30 PROCEDURE — 2500000004 HC RX 250 GENERAL PHARMACY W/ HCPCS (ALT 636 FOR OP/ED): Performed by: NURSE ANESTHETIST, CERTIFIED REGISTERED

## 2023-12-30 PROCEDURE — 7100000009 HC PHASE TWO TIME - INITIAL BASE CHARGE

## 2023-12-30 PROCEDURE — 7100000010 HC PHASE TWO TIME - EACH INCREMENTAL 1 MINUTE

## 2023-12-30 PROCEDURE — 2500000004 HC RX 250 GENERAL PHARMACY W/ HCPCS (ALT 636 FOR OP/ED): Performed by: SURGERY

## 2023-12-30 PROCEDURE — 45378 DIAGNOSTIC COLONOSCOPY: CPT | Performed by: SURGERY

## 2023-12-30 PROCEDURE — 81025 URINE PREGNANCY TEST: CPT

## 2023-12-30 RX ORDER — PROPOFOL 10 MG/ML
INJECTION, EMULSION INTRAVENOUS AS NEEDED
Status: DISCONTINUED | OUTPATIENT
Start: 2023-12-30 | End: 2023-12-30

## 2023-12-30 RX ORDER — LIDOCAINE HYDROCHLORIDE 20 MG/ML
INJECTION, SOLUTION INFILTRATION; PERINEURAL AS NEEDED
Status: DISCONTINUED | OUTPATIENT
Start: 2023-12-30 | End: 2023-12-30

## 2023-12-30 RX ORDER — SODIUM CHLORIDE, SODIUM LACTATE, POTASSIUM CHLORIDE, CALCIUM CHLORIDE 600; 310; 30; 20 MG/100ML; MG/100ML; MG/100ML; MG/100ML
100 INJECTION, SOLUTION INTRAVENOUS CONTINUOUS
Status: DISCONTINUED | OUTPATIENT
Start: 2023-12-30 | End: 2023-12-31 | Stop reason: HOSPADM

## 2023-12-30 RX ADMIN — PROPOFOL 20 MG: 10 INJECTION, EMULSION INTRAVENOUS at 09:25

## 2023-12-30 RX ADMIN — PROPOFOL 20 MG: 10 INJECTION, EMULSION INTRAVENOUS at 09:33

## 2023-12-30 RX ADMIN — SODIUM CHLORIDE, POTASSIUM CHLORIDE, SODIUM LACTATE AND CALCIUM CHLORIDE 100 ML/HR: 600; 310; 30; 20 INJECTION, SOLUTION INTRAVENOUS at 08:38

## 2023-12-30 RX ADMIN — PROPOFOL 20 MG: 10 INJECTION, EMULSION INTRAVENOUS at 09:29

## 2023-12-30 RX ADMIN — PROPOFOL 20 MG: 10 INJECTION, EMULSION INTRAVENOUS at 09:31

## 2023-12-30 RX ADMIN — PROPOFOL 20 MG: 10 INJECTION, EMULSION INTRAVENOUS at 09:35

## 2023-12-30 RX ADMIN — PROPOFOL 20 MG: 10 INJECTION, EMULSION INTRAVENOUS at 09:27

## 2023-12-30 RX ADMIN — PROPOFOL 100 MG: 10 INJECTION, EMULSION INTRAVENOUS at 09:23

## 2023-12-30 RX ADMIN — PROPOFOL 20 MG: 10 INJECTION, EMULSION INTRAVENOUS at 09:37

## 2023-12-30 RX ADMIN — LIDOCAINE HYDROCHLORIDE 100 MG: 20 INJECTION, SOLUTION INFILTRATION; PERINEURAL at 09:23

## 2023-12-30 SDOH — HEALTH STABILITY: MENTAL HEALTH: CURRENT SMOKER: 0

## 2023-12-30 ASSESSMENT — COLUMBIA-SUICIDE SEVERITY RATING SCALE - C-SSRS
2. HAVE YOU ACTUALLY HAD ANY THOUGHTS OF KILLING YOURSELF?: NO
6. HAVE YOU EVER DONE ANYTHING, STARTED TO DO ANYTHING, OR PREPARED TO DO ANYTHING TO END YOUR LIFE?: NO
1. IN THE PAST MONTH, HAVE YOU WISHED YOU WERE DEAD OR WISHED YOU COULD GO TO SLEEP AND NOT WAKE UP?: NO

## 2023-12-30 ASSESSMENT — PAIN SCALES - GENERAL
PAINLEVEL_OUTOF10: 0 - NO PAIN
PAIN_LEVEL: 0
PAINLEVEL_OUTOF10: 0 - NO PAIN
PAINLEVEL_OUTOF10: 0 - NO PAIN

## 2023-12-30 ASSESSMENT — PAIN - FUNCTIONAL ASSESSMENT
PAIN_FUNCTIONAL_ASSESSMENT: 0-10

## 2023-12-30 NOTE — H&P
History Of Present Illness  Jasmina Draper is a 33 y.o. female presenting for a low risk colonoscopy      Past Medical History  Past Medical History:   Diagnosis Date    Anxiety     Cholelithiasis     SVT (supraventricular tachycardia)     During Pregnancies, per pt       Surgical History  Past Surgical History:   Procedure Laterality Date     SECTION, CLASSIC N/A      SECTION, LOW TRANSVERSE      CHOLECYSTECTOMY      DILATION AND CURETTAGE OF UTERUS      WISDOM TOOTH EXTRACTION          Social History  She reports that she has never smoked. She has never used smokeless tobacco. She reports current alcohol use. She reports that she does not use drugs.    Family History  Family History   Problem Relation Name Age of Onset    Colon cancer Mother Stephy     Hypertension Mother Stephy     Cancer Maternal Grandfather Edgeviviana     Heart disease Maternal Grandmother Samara     Heart disease Paternal Grandfather Edgar     Diabetes Paternal Grandmother Nancy     Kidney disease Paternal Grandmother Nancy     Cancer Mother's Sister Kim         Allergies  Penicillin    Review of Systems     Physical Exam     Last Recorded Vitals  There were no vitals taken for this visit.    Relevant Results         Assessment/Plan   Active Problems: Low risk colon cancer   There are no active Hospital Problems.       COLONOSCOPY/BIOPSIES.  Risks include, but not limited to pain, infection, bleeding, perforation, missed lesions, aspiration, risk of cardiac, pulmonary, neurologic, locomotor, anesthetic events, incomplete colonoscopy, and other unforeseen complications including death      Des Mcclendon MD

## 2023-12-30 NOTE — DISCHARGE INSTRUCTIONS
Patient Instructions after a Colonoscopy      The anesthetics, sedatives or narcotics which were given to you today will be acting in your body for the next 24 hours, so you might feel a little sleepy or groggy.  This feeling should slowly wear off. Carefully read and follow the instructions.     You received sedation today:  - Do not drive or operate any machinery or power tools of any kind.   - No alcoholic beverages today, not even beer or wine.  - Do not make any important decisions or sign any legal documents.  - No over the counter medications that contain alcohol or that may cause drowsiness.  - Do not make any important decisions or sign any legal documents.    While it is common to experience mild to moderate abdominal distention, gas, or belching after your procedure, if any of these symptoms occur following discharge from the GI Lab or within one week of having your procedure, call the [facility] to be advised whether a visit to your nearest Urgent Care or Emergency Department is indicated.  Take this paper with you if you go.     - If you develop an allergic reaction to the medications that were given during your procedure such as difficulty breathing, rash, hives, severe nausea, vomiting or lightheadedness.  - If you experience chest pain, shortness of breath, severe abdominal pain, fevers and chills.  -If you develop signs and symptoms of bleeding such as blood in your spit, if your stools turn black, tarry, or bloody  - If you have not urinated within 8 hours following your procedure.  - If your IV site becomes painful, red, inflamed, or looks infected.    If you received a biopsy/polypectomy/sphincterotomy the following instructions apply below:    __ Do not use Aspirin containing products, non-steroidal medications or anti-coagulants for one week following your procedure. (Examples of these types of medications are: Advil, Arthrotec, Aleve, Coumadin, Ecotrin, Heparin, Ibuprofen,  Indocin, Motrin, Naprosyn, Nuprin, Plavix, Vioxx, and Voltarin, or their generic forms.  This list is not all-inclusive.  Check with your physician or pharmacist before resuming medications.)   __ Eat a soft diet today.  Avoid foods that are poorly digested for the next 24 hours.  These foods would include: nuts, beans, lettuce, red meats, and fried foods. Start with liquids and advance your diet as tolerated, gradually work up to eating solids.   __ Do not have a Barium Study or Enema for one week.    Your physician recommends the additional following instructions:    -You have a contact number available for emergencies. The signs and symptoms of potential delayed complications were discussed with you. You may return to normal activities tomorrow.  -Resume your previous diet.  -Continue your present medications.   -We are waiting for your pathology results.  -Your physician has recommended a repeat colonoscopy (date to be determined after pending pathology results are reviewed) for surveillance based on pathology results.  -The findings and recommendations have been discussed with you.  -The findings and recommendations were discussed with your family.  - Please see Medication Reconciliation Form for new medication/medications prescribed.       If you experience any problems or have any questions following discharge from the GI Lab, please call:        Nurse Signature                                                                        Date___________________                                                                            Patient/Responsible Party Signature                                        Date___________________

## 2023-12-30 NOTE — ANESTHESIA PREPROCEDURE EVALUATION
Patient: Jasmina Draper    Procedure Information       Date/Time: 12/30/23 0900    Scheduled providers: Des Mcclendon MD    Procedure: COLONOSCOPY    Location: [facility]            Relevant Problems   Cardiovascular   (+) SVT (supraventricular tachycardia)      Endocrine   (+) Obesity      Neuro/Psych   (+) Anxiety      GI/Hepatic   (+) Calculus of gallbladder without cholecystitis without obstruction   (+) Cholelithiasis and acute cholecystitis without obstruction       Clinical information reviewed:   Tobacco  Allergies  Meds   Med Hx  Surg Hx  OB Status  Fam Hx  Soc   Hx        NPO Detail:  NPO/Void Status  Carbonhydrate Drink Given Prior to Surgery? : N  Date of Last Liquid: 12/30/23  Time of Last Liquid: 0630  Date of Last Solid: 12/28/23  Time of Last Solid: 2000  Last Intake Type: Clear fluids  Time of Last Void: 0800         Physical Exam    Airway  Mallampati: I     Cardiovascular - normal exam     Dental - normal exam     Pulmonary - normal exam     Abdominal - normal exam             Anesthesia Plan    ASA 2     MAC     The patient is not a current smoker.  Patient did not smoke on day of procedure.  Education provided regarding risk of obstructive sleep apnea.  intravenous induction   Anesthetic plan and risks discussed with patient.

## 2023-12-30 NOTE — ANESTHESIA POSTPROCEDURE EVALUATION
Patient: Jasmina Draper    Procedure Summary       Date: 12/30/23 Room / Location: [facility]    Anesthesia Start: 0920 Anesthesia Stop: 0942    Procedure: COLONOSCOPY Diagnosis: Encounter for screening colonoscopy    Scheduled Providers: Des Mcclendon MD Responsible Provider: ASHER Khan    Anesthesia Type: MAC ASA Status: 2            Anesthesia Type: MAC    Vitals Value Taken Time   BP 96/53 12/30/23 0944   Temp 36 12/30/23 0944   Pulse 70 12/30/23 0944   Resp 14 12/30/23 0944   SpO2 96 12/30/23 0944       Anesthesia Post Evaluation    Patient location during evaluation: PACU  Patient participation: complete - patient participated  Level of consciousness: awake and alert  Pain score: 0  Pain management: adequate  Airway patency: patent  Two or more strategies used to mitigate risk of obstructive sleep apnea  Cardiovascular status: acceptable  Respiratory status: acceptable  Hydration status: acceptable  Postoperative Nausea and Vomiting: none        No notable events documented.

## 2024-01-02 ASSESSMENT — PAIN SCALES - GENERAL: PAINLEVEL_OUTOF10: 0 - NO PAIN

## 2024-01-02 NOTE — ADDENDUM NOTE
Encounter addended by: Shanique Mercado RN on: 1/2/2024 2:07 PM   Actions taken: Demographics modified

## 2024-01-02 NOTE — ADDENDUM NOTE
Encounter addended by: Shanique Mercado RN on: 1/2/2024 1:57 PM   Actions taken: Contacts section saved, Flowsheet accepted

## 2024-02-13 NOTE — PROGRESS NOTES
"Subjective   Patient ID: Jasmina Draper is a 33 y.o. female who presents for Establish Care and Hospital Follow-up (ED Exira-DX with gallstones- following: Gen surge with Dr. Khan.).    HPI seeing dr khan for lap mu  ABN COLON ON CT ALSO ABD FMHX COLON CANCER   PROBABLY NEEDS COLONOSCOPY     Review of Systems   Constitutional:  Negative for activity change, appetite change, fever and unexpected weight change.   HENT:  Negative for congestion, ear pain, postnasal drip, rhinorrhea, sinus pressure and sore throat.    Eyes:  Negative for discharge, itching and visual disturbance.   Respiratory:  Negative for cough, shortness of breath and wheezing.    Cardiovascular:  Negative for chest pain, palpitations and leg swelling.   Gastrointestinal:  Positive for abdominal distention and abdominal pain. Negative for blood in stool, constipation, diarrhea, nausea and vomiting.        DIET CONTROL FOR PAIN IN RUQ      Endocrine: Negative for cold intolerance, heat intolerance and polyuria.   Genitourinary:  Negative for dysuria, flank pain and hematuria.   Musculoskeletal:  Negative for arthralgias, gait problem, joint swelling and myalgias.   Skin:  Negative for rash.   Allergic/Immunologic: Negative for environmental allergies and food allergies.   Neurological:  Negative for dizziness, syncope, weakness, light-headedness, numbness and headaches.   Psychiatric/Behavioral:  Negative for dysphoric mood and sleep disturbance. The patient is not nervous/anxious.        Objective   /72   Pulse 91   Temp 36 °C (96.8 °F)   Ht 1.664 m (5' 5.5\")   Wt 115 kg (253 lb)   SpO2 98%   BMI 41.46 kg/m²     Physical Exam  Vitals and nursing note reviewed.   Constitutional:       Appearance: Normal appearance.   HENT:      Head: Normocephalic.      Mouth/Throat:      Mouth: Mucous membranes are moist.   Cardiovascular:      Rate and Rhythm: Normal rate and regular rhythm.      Pulses: Normal pulses.      Heart sounds: Normal " 22min via telephone heart sounds. No murmur heard.     No friction rub. No gallop.   Pulmonary:      Effort: Pulmonary effort is normal. No respiratory distress.      Breath sounds: Normal breath sounds. No wheezing.   Abdominal:      General: Bowel sounds are normal. There is no distension.      Palpations: Abdomen is soft.      Tenderness: There is no abdominal tenderness.   Musculoskeletal:         General: No deformity. Normal range of motion.   Skin:     General: Skin is warm and dry.      Capillary Refill: Capillary refill takes less than 2 seconds.   Neurological:      General: No focal deficit present.      Mental Status: She is alert and oriented to person, place, and time.   Psychiatric:         Mood and Affect: Mood normal.         Assessment/Plan   Diagnoses and all orders for this visit:  Calculus of gallbladder without cholecystitis without obstruction

## (undated) DEVICE — RETRIEVAL SYSTEM, MONARCH, 10MM DISP ENDOSCOPIC

## (undated) DEVICE — GLOVE, SURGICAL, PROTEXIS PI , 7.0, PF, LF

## (undated) DEVICE — SUTURE, VICRYL, 0, 27 IN, UR-6, VIOLET

## (undated) DEVICE — SOLUTION, IRRIGATION, USP, SODIUM CHLORIDE 0.9%, 3000 ML

## (undated) DEVICE — SUTURE, MONOCRYL, 4-0, 18 IN, PS2, UNDYED

## (undated) DEVICE — 00000 VISIT COUNTER

## (undated) DEVICE — FILTER, LAPAROSCOPIC, PLUME PORT, W/LUER CONNECTOR, STERILE

## (undated) DEVICE — SOLUTION, IRRIGATION, STERILE WATER, 1000 ML, POUR BOTTLE

## (undated) DEVICE — DRAPE PACK, LAP CHOLE, CUSTOM, GENEVA

## (undated) DEVICE — PAD, GROUNDING, ELECTROSURGICAL, W/9 FT CABLE, POLYHESIVE II, ADULT, LF

## (undated) DEVICE — CLIP, LIGATING, HEM-O-LOCK, MEDIUM/LARGE, LF, GREEN

## (undated) DEVICE — GLOVE, SURGICAL, PROTEXIS PI BLUE W/NEUTHERA, 7.0, PF, LF

## (undated) DEVICE — HOLSTER, JET SAFETY

## (undated) DEVICE — TROCAR SYSTEM, BALLOON, KII GELPORT, 12 X 100MM

## (undated) DEVICE — TROCAR, KII OPTICAL BLADELESS 5MM Z THREAD 100MM LNGTH

## (undated) DEVICE — COVER, CAMERA, HANDLE, LIGHTING/VISUALIZATION

## (undated) DEVICE — ACCESS SYS, KII OPTICAL, Z-THREAD, 11X100CM